# Patient Record
Sex: MALE | Race: WHITE | URBAN - METROPOLITAN AREA
[De-identification: names, ages, dates, MRNs, and addresses within clinical notes are randomized per-mention and may not be internally consistent; named-entity substitution may affect disease eponyms.]

---

## 2022-01-16 ENCOUNTER — HOSPITAL ENCOUNTER (OUTPATIENT)
Dept: OCCUPATIONAL THERAPY | Facility: HOSPITAL | Age: 67
End: 2022-01-19
Attending: STUDENT IN AN ORGANIZED HEALTH CARE EDUCATION/TRAINING PROGRAM | Admitting: STUDENT IN AN ORGANIZED HEALTH CARE EDUCATION/TRAINING PROGRAM

## 2022-04-28 NOTE — CONSULTS
Patient:   Beltran Cardona            MRN: [id number]            FIN: [id number]               Age:   66 years     Sex:  Male     :  1955   Associated Diagnoses:   None   Author:   Gumaro Ewing MD      Chief Complaint   2022 5:09 CST       Complaint of nosebleed, onset at 0100.  Denies blood thinners, but does take aspirin 81mg.  Bleeding from right nare.        History of Present Illness   Patient is a 66-year-old male who had a 2-week history of prior to admission of recurrent epistaxis which has been mostly self-limiting went on the night prior to admission he started bleeding at 1:00 in the morning which brought him into the emergency room and he has significant bleed dropping his hemoglobin by two points.  He was seen in the emergency room where a 4.5 cm Rhino Rocket was placed and did not control it and was switched to a 7.5 cm Rhino Rocket which did control the bleeding.  He has had this in place and has had no further bleeding since that time.  He does have a history of blood pressure medication as well as he takes an aspirin daily.  The aspirin has been stopped.      Health Status   Allergies:    Allergic Reactions (Selected)  No Known Medication Allergies,    Allergies (1) Active Reaction  No Known Medication Allergies None Documented     Current medications:  (Selected)   Inpatient Medications  Ordered  Afrin Nasal Sinus: 2 spray(s), form: Spray, Nasal, As Directed, first dose 22 5:29:00 CST, Waste Code BKC  Augmentin 500 mg-125 mg oral tablet: 500 mg, form: Tab Other - see special instructions, Oral, TID, first dose 22 14:00:00 CST, Nasal packing  Lactated Ringers Injection intravenous solution 1,000 mL: 1,000 mL, 1,000 mL, IV, 75 mL/hr, start date 22 9:52:00 CST, 2, m2  PARoxetine: 20 mg, form: Tab, Oral, Daily, first dose 22 13:44:00 CST, STAT  Zofran: 4 mg, form: Injection, IV Push, q4hr PRN for nausea, first dose 22 9:52:00 CST, STAT, choose  first if ordered with other treatments for nausea  acetaminophen: 1,000 mg, form: Tab, Oral, q6hr PRN for pain, first dose 01/16/22 9:52:00 CST, STAT, mild/moderate  atorvastatin: 40 mg, form: Tab, Oral, Daily, first dose 01/16/22 17:00:00 CST  ezetimibe: 10 mg, form: Tab, Oral, Daily, first dose 01/16/22 13:44:00 CST, STAT  hydrochlorothiazide: 12.5 mg, form: Tab, Oral, Daily, first dose 01/16/22 13:44:00 CST, STAT  losartan 50 mg oral tablet: 50 mg, form: Tab, Oral, Daily, first dose 01/16/22 13:44:00 CST, STAT  metoprolol tartrate 50 mg oral tab: 50 mg, form: Tab, Oral, BID, first dose 01/16/22 21:00:00 CST  Documented Medications  Documented  Bystolic 10 mg oral tablet: 10 mg = 1 tab(s), Oral, Daily  Ranexa 500mg Tab extended release: 500 mg = 1 tab(s), Oral, BID  aspirin 325 mg oral Delayed Release (EC) tablet: 325 mg = 1 tab(s), Oral, Daily, # 30 tab(s), 0 Refill(s)  cilostazol 100 mg oral tablet: 100 mg = 1 tab(s), Oral, BID  ezetimibe 10 mg oral tablet: 10 mg = 1 tab(s), Oral, Daily  furosemide 40 mg oral tablet: 40 mg = 1 tab(s), Oral, Daily  hydrochlorothiazide 12.5 mg oral tablet: 12.5 mg = 1 tab(s), Oral, Daily  losartan 50 mg oral tablet: 50 mg = 1 tab(s), Oral, Daily  meloxicam 15 mg oral tablet: 15 mg = 1 tab(s), Oral, Daily  paroxetine 20 mg oral tablet: 20 mg = 1 tab(s), Oral, Daily  simvastatin 80 mg oral tablet: 80 mg = 1 tab(s), Oral, Daily,    Medications (11) Active  Scheduled: (8)  amoxicillin-clavulanate 500 mg UD  500 mg 1 tab(s), Oral, TID  atorvastatin 40 mg Tab  40 mg 1 tab(s), Oral, Daily  ezetimibe 10 mg Tab  10 mg 1 tab(s), Oral, Daily  hydrochlorothiazide 25 mg Tab UD  12.5 mg 0.5 tab(s), Oral, Daily  losartan 50 mg Tab UD  50 mg 1 tab(s), Oral, Daily  metoprolol tartrate 50 mg Tab UD  50 mg 1 tab(s), Oral, BID  oxymetazoline nasal 0.05% Spray - 30mL  2 spray(s), Nasal, As Directed  paroxetine 20 mg Tab  20 mg 1 tab(s), Oral, Daily  Continuous: (1)  lactated ringers 1,000 mL  1,000  mL, IV, 75 mL/hr  PRN: (2)  acetaminophen 500 mg Tab  1,000 mg 2 tab(s), Oral, q6hr  ondansetron 2 mg/mL inj - 2mL  4 mg 2 mL, IV Push, q4hr     Problem list:    All Problems  Obesity / SNOMED CT 9901961525 / Probable  Tobacco user / SNOMED CT 019402717 / Confirmed      Histories   Past Medical History:    No active or resolved past medical history items have been selected or recorded.   Family History:    No family history items have been selected or recorded.   Social History        Social & Psychosocial Habits    Tobacco  01/16/2022  Use: 10 or more cigarettes (1/    Patient Wants Consult For Cessation Counseling No    Abuse/Neglect  01/16/2022  SHX Any signs of abuse or neglect No    Feels unsafe at home: No    Safe place to go: Yes  .        Physical Examination   Vital Signs   1/17/2022 6:00 CST       Peripheral Pulse Rate     68 bpm                             Respiratory Rate          20 br/min                             SpO2                      96 %                             Oxygen Therapy            Room air                             Systolic Blood Pressure   157 mmHg  HI                             Diastolic Blood Pressure  69 mmHg                             Mean Arterial Pressure, Cuff              98 mmHg    1/17/2022 4:00 CST       Peripheral Pulse Rate     61 bpm                             Respiratory Rate          20 br/min                             SpO2                      96 %                             Oxygen Therapy            Room air                             Systolic Blood Pressure   151 mmHg  HI                             Diastolic Blood Pressure  73 mmHg                             Mean Arterial Pressure, Cuff              99 mmHg    1/17/2022 0:00 CST       Peripheral Pulse Rate     72 bpm                             Heart Rate Monitored      72 bpm                             Respiratory Rate          21 br/min                             SpO2                      95 %                              Oxygen Therapy            Room air                             Systolic Blood Pressure   131 mmHg                             Diastolic Blood Pressure  60 mmHg                             Mean Arterial Pressure, Cuff              84 mmHg    1/16/2022 22:00 CST      Peripheral Pulse Rate     79 bpm                             Heart Rate Monitored      78 bpm                             Respiratory Rate          23 br/min                             SpO2                      95 %                             Oxygen Therapy            Room air                             Systolic Blood Pressure   123 mmHg                             Diastolic Blood Pressure  61 mmHg                             Mean Arterial Pressure, Cuff              82 mmHg    1/16/2022 19:00 CST      Peripheral Pulse Rate     91 bpm                             Heart Rate Monitored      95 bpm                             Respiratory Rate          17 br/min                             SpO2                      97 %                             Oxygen Therapy            Room air                             Systolic Blood Pressure   131 mmHg                             Diastolic Blood Pressure  45 mmHg  LOW                             Mean Arterial Pressure, Cuff              74 mmHg    1/16/2022 17:00 CST      Peripheral Pulse Rate     81 bpm                             Heart Rate Monitored      82 bpm                             Respiratory Rate          18 br/min                             SpO2                      98 %                             Oxygen Therapy            Room air                             Systolic Blood Pressure   140 mmHg                             Diastolic Blood Pressure  59 mmHg  LOW                             Mean Arterial Pressure, Cuff              86 mmHg    1/16/2022 15:00 CST      Peripheral Pulse Rate     78 bpm                             Heart Rate Monitored      77 bpm                              Respiratory Rate          18 br/min                             SpO2                      97 %                             Oxygen Therapy            Room air                             Systolic Blood Pressure   165 mmHg  HI                             Diastolic Blood Pressure  66 mmHg                             Mean Arterial Pressure, Cuff              99 mmHg    1/16/2022 13:00 CST      Peripheral Pulse Rate     75 bpm                             Heart Rate Monitored      75 bpm                             Respiratory Rate          15 br/min                             SpO2                      98 %                             Oxygen Therapy            Room air                             Systolic Blood Pressure   167 mmHg  HI                             Diastolic Blood Pressure  73 mmHg                             Mean Arterial Pressure, Cuff              104 mmHg    1/16/2022 11:15 CST      Peripheral Pulse Rate     84 bpm                             Heart Rate Monitored      82 bpm                             Respiratory Rate          24 br/min                             SpO2                      99 %                             Oxygen Therapy            Room air                             Systolic Blood Pressure   153 mmHg  HI                             Diastolic Blood Pressure  70 mmHg                             Mean Arterial Pressure, Cuff              98 mmHg    1/16/2022 8:48 CST       Systolic Blood Pressure   156 mmHg  HI                             Diastolic Blood Pressure  87 mmHg    1/16/2022 7:37 CST       Systolic Blood Pressure   92 mmHg                             Diastolic Blood Pressure  57 mmHg  LOW    1/16/2022 7:10 CST       Temperature Oral          36.5 DegC                             Temperature Oral (calculated)             97.70 DegF                             Peripheral Pulse Rate     79 bpm                             Respiratory Rate          20 br/min                              SpO2                      95 %                             Oxygen Therapy            Room air                             Systolic Blood Pressure   102 mmHg                             Diastolic Blood Pressure  63 mmHg    1/16/2022 5:09 CST       Temperature Oral          36.6 DegC                             Temperature Oral (calculated)             97.88 DegF                             Peripheral Pulse Rate     86 bpm                             Respiratory Rate          20 br/min                             SpO2                      93 %  LOW                             Systolic Blood Pressure   148 mmHg  HI                             Diastolic Blood Pressure  76 mmHg        Vital Signs (last 24 hrs)_____  Last Charted___________  Heart Rate Peripheral   68 bpm  (JAN 17 06:00)  Resp Rate         20 br/min  (JAN 17 06:00)  SBP      H 157mmHg  (JAN 17 06:00)  DBP      69 mmHg  (JAN 17 06:00)  SpO2      96 %  (JAN 17 06:00)  Weight      90.3 kg  (JAN 16 18:18)  Height      168 cm  (JAN 16 18:18)  BMI      31.99  (JAN 16 18:18)     Awake alert in good spirits.  He remains down in the emergency department in room and holding right now.  Rhino Rocket is in place there is no active bleeding.  He denies any bleeding since having the rocket placed  I will probably go ahead and remove the pack tomorrow unless there is any problems if he has any problems with bleeding.      Review / Management   Results review:     Labs (Last four charted values)  WBC                  H 17.6 (JAN 17) H 13.2 (JAN 16)   Hgb                  L 10.9 (JAN 17) L 10.7 (JAN 16) L 11.0 (JAN 16) L 12.8 (JAN 16)   Hct                  L 33.9 (JAN 17) L 32.6 (JAN 16) L 33.8 (JAN 16) L 39.4 (JAN 16)   Plt                  203 (JAN 17) 268 (JAN 16)   Na                   136 (JAN 17)   K                    4.0 (JAN 17)   CO2                  L 22 (JAN 17)   Cl                   106 (JAN 17)   Cr                   0.81 (JAN 17)   BUN                   16.0 (JAN 17)   Glucose Random       H 177 (JAN 17)   PT                   13.7 (JAN 16)   INR                  1.1 (JAN 16)   PTT                  32.2 (JAN 16) .       Impression and Plan   Epistaxis    To see his blood pressure controlled and allow his platelets to return for least another day and we will go ahead and remove the pack tomorrow if all is well.

## 2022-04-28 NOTE — ED PROVIDER NOTES
Patient:   Beltran Cardona            MRN: [id number]            FIN: [id number]               Age:   66 years     Sex:  Male     :  1955   Associated Diagnoses:   Epistaxis; Acute blood loss anemia   Author:   Juanito Mccurdy MD      Basic Information   Time seen: Date & time 2022 05:05:00.   History source: Patient.   Arrival mode: Private vehicle.   History limitation: None.      History of Present Illness   The patient presents with   A 66-year-old male presents to ED c/o intermittent bleeding onset over the past few weeks. However, tonight he began bleeding from the right nare that has mckay on going since 0100. Associated symptoms and slight light headedness. He denies use of blood thinners, but reports he takes 81mg aspirin and fish oil. Pt reports pressure to the area does not provide relief. .  The onset was 0100.  The course/duration of symptoms is constant.  Location: Right nare. Type of injury: none.  The degree at onset was moderate.  The degree at present is minimal.  Risk factors consist of.  Prior episodes: none.  Therapy today: none.  Associated symptoms: Slight lightheadedness, blood sputum .  Additional history: none.        Review of Systems   Constitutional symptoms:  No fever, no chills   Skin symptoms:  No rash, no pruritus, no abrasions, no breakdown, no lesion.    Eye symptoms:  No pain, no blurred vision.    ENMT symptoms:  Blood sputum , no ear pain, no sore throat, no nasal congestion, Nose: Bleeding.    Respiratory symptoms:  No shortness of breath, no cough, no sputum production   Cardiovascular symptoms:  No chest pain, no palpitations, no diaphoresis   Gastrointestinal symptoms:  No abdominal pain, no nausea, no vomiting, no diarrhea, no constipation.    Genitourinary symptoms:  No dysuria, no hematuria, no discharge.    Musculoskeletal symptoms:  No Muscle pain, no Joint pain   Neurologic symptoms:  Slight light headedness, no headache, no dizziness, no altered  level of consciousness, no numbness, no tingling, no weakness.              Additional review of systems information: All other systems reviewed and otherwise negative.      Health Status   Allergies: No known allergies.   Medications:  (Selected)   Inpatient Medications  Ordered  Afrin Nasal Sinus: 2 spray(s), form: Spray, Nasal, As Directed, first dose 01/16/22 5:29:00 CST, Waste Code BKC.      Past Medical/ Family/ Social History   Medical history:   Tobacco user   quadruple bypass.   Surgical history:    No active procedure history items have been selected or recorded..   Family history:    No family history items have been selected or recorded..   Social history: Tobacco use: Regularly.      Physical Examination               Vital Signs   Vital Signs   1/16/2022 7:10 CST       Temperature Oral          36.5 DegC                             Temperature Oral (calculated)             97.70 DegF                             Peripheral Pulse Rate     79 bpm                             Respiratory Rate          20 br/min                             SpO2                      95 %                             Oxygen Therapy            Room air                             Systolic Blood Pressure   102 mmHg                             Diastolic Blood Pressure  63 mmHg    1/16/2022 5:09 CST       Temperature Oral          36.6 DegC                             Temperature Oral (calculated)             97.88 DegF                             Peripheral Pulse Rate     86 bpm                             Respiratory Rate          20 br/min                             SpO2                      93 %  LOW                             Systolic Blood Pressure   148 mmHg  HI                             Diastolic Blood Pressure  76 mmHg  .   Measurements   1/16/2022 5:09 CST       Weight Dosing             90.3 kg                             Weight Measured and Calculated in Lbs     199.08 lb                             Weight Estimated           90.3 kg                             Height/Length Dosing      168 cm                             Height/Length Estimated   168 cm                             Body Mass Index Estimated 31.99 kg/m2  .   Basic Oxygen Information   1/16/2022 5:09 CST       SpO2                      93 %  LOW  .   General:  Alert, no acute distress.    Skin:  Warm, dry, pink.    Head:  Normocephalic, atraumatic.    Neck:  Supple, trachea midline.    Eye:  Pupils are equal, round and reactive to light, extraocular movements are intact, normal conjunctiva.    Ears, nose, mouth and throat:  Blood in oropharynx, Nose: Steady bleeding to right nare with blood to the back throat .    Cardiovascular:  Regular rate and rhythm, No murmur.    Gastrointestinal:  Soft, Nontender, Non distended, Normal bowel sounds.    Musculoskeletal:  Normal ROM, normal strength, no tenderness, no swelling, no deformity.    Neurological:  Alert and oriented to person, place, time, and situation, No focal neurological deficit observed.       Medical Decision Making   Differential Diagnosis:  Anterior epistaxis, posterior epistaxis.    Rationale:  Initially patient is well-appearing.  He has a constant drip from his right nare.  Wife reports history of epistaxis off and on for approximately 10 minutes for a few weeks however today it has been ongoing for over 5 to 6 hours.  Attempts to stop with pressure are unsuccessful.  Packing with Rhino Rocket is attempted twice.  Initially with a 4.5 cm packing that does stop bleeding from right nare but unfortunately diverts blood to left nare.  With concern for posterior bleed a 7.5 cm packing is placed, after initial dribbling, bleeding stops.  Approximately 20 minutes after patient becomes diaphoretic, pale, reports feeling weak.  This may have been a vasovagal reaction to the packing however his labs do reflect a two-point decrease in hemoglobin after approximately 2 hours.  Patient once again reiterates he is  only on aspirin, denies any other anticoagulations.  I discussed the case with ENT on-call, Dr. Gumaro Ewing, who states he will see patient either later today or tomorrow .  Patient will be placed on Augmentin for prophylaxis.  Given patient's significant acute blood loss anemia, symptoms, epistaxis, and lack of ENT in his hometown, he will be admitted to observation..   Documents reviewed:  Emergency department nurses' notes.   Orders  Launch Order Profile (Selected)   Inpatient Orders  InProcess (Ordered)  EK22 7:36:00 CST, Stat, Once, 22 7:36:00 CST, -1, -1, 22 7:36:00 CST  Ordered  Afrin Nasal Sinus: 2 spray(s), form: Spray, Nasal, As Directed, first dose 22 5:29:00 CST, Waste Code BKC  Ordered (In-Lab)  Hemoglobin and Hematocrit: STAT collect, 22 8:01:17 CST, BLOOD, Collected, Stop date 22 8:01:00 CST, Lab Collect  Completed  Afrin Nasal Sinus: 2 spray(s), form: Spray-Nasal, Both Nostrils, As Directed, first dose 22 5:19:00 CST, Waste Code BKC  Automated Diff: NOW collect, 22 5:57:00 CST, Blood, Collected, Stop date 22 5:57:00 CST, Lab Collect, Print Label By Order Location, 22 5:28:00 CST  CBC w/ Auto Diff: NOW collect, 22 5:57:42 CST, BLOOD, Collected, Stop date 22 5:57:00 CST, Lab Collect  PT (Protime): STAT collect, 22 5:57:42 CST, BLOOD, Collected, Stop date 22 5:57:00 CST, Lab Collect  PTT: STAT collect, 22 5:57:42 CST, BLOOD, Collected, Stop date 22 5:57:00 CST, Lab Collect  Sodium Chloride 0.9% intravenous solution: 1,000 mL, IV, AdHoc, start date 22 7:37:00 CST, stop date 22 7:37:00 CST.   Electrocardiogram:  Time 2022 08:04:00, rate 76, normal sinus rhythm, No ST-T changes, no ectopy, EP Interp, The Axis is Right axis deviation .  , P wave and NE interval poor R wave progression, QT interval Prolonged 474 seconds.    Results review:  Lab results : [facility]   2022 5:57 CST        WBC                       13.2 x10(3)/mcL  HI                             RBC                       4.28 x10(6)/mcL  LOW                             Hgb                       12.8 gm/dL  LOW                             Hct                       39.4 %  LOW                             Platelet                  268 x10(3)/mcL                             MCV                       92.1 fL                             MCH                       29.9 pg                             MCHC                      32.5 gm/dL  LOW                             RDW                       13.1 %                             MPV                       10.5 fL                             Abs Neut                  7.25 x10(3)/mcL                             Neutro Auto               55 %  NA                             Lymph Auto                32 %  NA                             Mono Auto                 7 %  NA                             Eos Auto                  4 %  NA                             Abs Eos                   0.6 x10(3)/mcL                             Basophil Auto             1 %  NA                             Abs Neutro                7.25 x10(3)/mcL                             Abs Lymph                 4.2 x10(3)/mcL                             Abs Mono                  0.9 x10(3)/mcL                             Abs Baso                  0.2 x10(3)/mcL                             PT                        13.7 second(s)                             INR                       1.1                             PTT                       32.2 second(s)  .      Reexamination/ Reevaluation   Time: 1/16/2022 08:14:00 .   Pain status: resolved.   Notes: Bleeding controlled however patient feels weak, is pale, diaphoretic..      Procedure   Nose bleed control procedure   Time: 1/16/2022 08:14:00 .    Confirmed: Patient, procedure, side, and site correct.    Consent: Patient.    Location of bleeding: Right nasal canal,  Anteriorly, Posteriorly.    Preparation: External pressure, Afrin.       Description   Attempt: # 1.   Packing: right, anterior, nasal rocket, Bleeding at right nare stops however there is now bleeding from left nare.      Description   Attempt: # 2.   Packing: right, anterior, posterior, nasal rocket, 7.5 cm Rhino Rocket is placed in right nare. Initially there continues to be some bleeding however after approximately 20 minutes there is no longer any bleeding, no dripping, no bleeding from left nare..   Post procedure bleeding: None.    Patient tolerated: Fair.    Complications: None.    Follow-up: Ear nose and throat physician.    Performed by: Self.    Total time: 30 minutes.       Impression and Plan   Diagnosis   Epistaxis (YSI23-EL R04.0)   Acute blood loss anemia ([id number] D62)      Calls-Consults   -  1/16/2022 08:53:00 , ENT, Paged.    -  1/16/2022 08:55:00 , ENT, phone call, consult, recommends They will come see patient tomorrow.    Plan   Condition: Improved, Stable.    Disposition: Medically cleared, Admit time  1/16/2022 09:12:00, Place in Observation Unit.    Counseled: Patient, Regarding diagnosis, Regarding diagnostic results, Regarding treatment plan, Patient indicated understanding of instructions.    Notes: I, Lexi Rowley, acted solely as a scribe for and in the presence of Dr. Santana who performed this service. .       Addendum   I, Juanito Santana MD, personally performed the services described in this documentation as described in my presence. I performed the history, physical exam, and had face-to-face time with the patient.  This note is accurate and complete.

## 2022-05-01 NOTE — HISTORICAL [facility] CERNER
This is a historical note converted from [employer]. Formatting and pictures may have been removed.  Please reference [employer] for original formatting and attached multimedia. Chief Complaint  Complaint of nosebleed, onset at 0100. ?Denies blood thinners, but does take aspirin 81mg. ?Bleeding from right nare.  History of Present Illness  66 year old?  male presented to the ED with reports of nose bleed which started at? 1 am. Pt reports he tried pinching his nose and applying pressure which his nose continues to bleed which he presented to the ED for further evaluation. PMH includes CAD, HTN, HLD, PVD/PAD, chronic neck and back pain?. He reports prior episode which he is able to stop the bleeding in the past. He denies any injury or trauma. He reports?the began bleeding from the right nare. He reported light headedness and dizziness, denies any syncope. He denies any chest pain, SOB, fever, chills, N/V/D. Labs done revealed CBC is 13.2, H&H 12.8/39.4, platelets 268; other indices unremarkable.? Patient had a repeat H&H 11/33.8 with slight drop. PT is not on any AC therapy. He only take ASA at home. ENT was consulted? who will see patient in the am per ED provider. PT?VS stable and bleeding has stopped. Pt is admitted to hospital medicine services for further management..  Review of Systems  ????Except as document, all other systems reviewed and negative  Physical Exam  Vitals & Measurements  T:?36.5? ?C (Oral)? TMIN:?36.5? ?C (Oral)? TMAX:?36.6? ?C (Oral)? HR:?84(Peripheral)? HR:?82(Monitored)? RR:?24? BP:?153/70? SpO2:?99%? WT:?90.3?kg?  General:?chronically ill appearing, non-toxic, look older than stated age, NAD, wife at bedside  Eye: PERRL,? clear conjunctiva  HENT:?Atraumatic, OP no erythema, right nare packing noted, mild congestion,?nasal mucosal surfaces moist, no maxillary/frontal sinus tenderness to palpation  Neck: full range of motion, no thyromegaly or lymphadenopathy  Respiratory:?diminished  bilaterally, symmetrical expansion, unlabored, stable O 2 saturations  Cardiovascular:?regular rate and rhythm cap refill brisk, no edema  Gastrointestinal:?soft, non-tender, non-distended with normal bowel sounds, without masses to palpation  Genitourinary: no CVA tenderness to palpation  Musculoskeletal:?full range of motion of all extremities  Integumentary: warm and dry  Neurologic: cranial nerves intact, no signs of peripheral neurological deficit, motor/sensory function intact  Psychiatric: cooperative, appropriate mood and affect  Cognition and Speech: clear and coherent  Assessment/Plan  IMPRESSION:  Acute epistaxis- resolving  Anemia- secondary to blood loss  Hypovolemic hypotension- resolved  Dizziness/Lightheadedness  CAD- s/p?MI and CABG  Chronic neck and back pain  Weakness  ?   PLAN:  Serial   ENT consulted, appreciate assistance and recommendations  Maintain nasal packing until evaluated by ENT services  Hold any AC therapy, hold ASA  Resume home medication as deemed necessary  Labs in the am  GI/DVT prophylaxis  ?   Source of history:patient, family ED provider, medical record  Present at bedside: family  Referral source: ED  History limitation: none  ?   PCP: non-staff  ?   ADVANCED DIRECTIVES: none  CODE STATUS:? full  ?   I, Aimee Longoria APRN, FNP, reviewed and discussed the case with Dr. ADRIAN Gunter.?See addendum for further per MD.?  ?  ?  ?  I Dr. Gunter Assumed care of patient today at 14:00  ?  For this patient encounter, I reviewed the NP/PA documentation, treatment plan and medical decision making, and I had face to face time with this patient.?  ?  Seen and examined the patient agree with above history physical exam and management.  Hold AC and aspirin.  ENT consult.  Patient is doing well by tomorrow possible discharge.  Check H&H.   Problem List/Past Medical History  CAD, HTN, HLD, PVD/PAD, chronic neck and back pain  Procedure/Surgical History  CABG, back surgery, neck surgery,  right knee surgery  Medications  Inpatient  acetaminophen, 1000 mg= 2 tab(s), Oral, q6hr, PRN  Afrin Nasal Sinus, 2 spray(s), Nasal, As Directed  Augmentin 500 mg-125 mg oral tablet, 500 mg= 1 tab(s), Oral, TID  Lactated Ringers Injection intravenous solution 1,000 mL, 1000 mL, IV  Zofran, 4 mg= 2 mL, IV Push, q4hr, PRN  Home  aspirin 325 mg oral Delayed Release (EC) tablet, 325 mg= 1 tab(s), Oral, Daily  Bystolic 10 mg oral tablet, 10 mg= 1 tab(s), Oral, Daily  cilostazol 100 mg oral tablet, 100 mg= 1 tab(s), Oral, BID  ezetimibe 10 mg oral tablet, 10 mg= 1 tab(s), Oral, Daily  furosemide 40 mg oral tablet, 40 mg= 1 tab(s), Oral, Daily  hydrochlorothiazide 12.5 mg oral tablet, 12.5 mg= 1 tab(s), Oral, Daily  losartan 50 mg oral tablet, 50 mg= 1 tab(s), Oral, Daily  meloxicam 15 mg oral tablet, 15 mg= 1 tab(s), Oral, Daily  paroxetine 20 mg oral tablet, 20 mg= 1 tab(s), Oral, Daily  Ranexa 500mg Tab extended release, 500 mg= 1 tab(s), Oral, BID  simvastatin 80 mg oral tablet, 80 mg= 1 tab(s), Oral, Daily  Allergies  No Known Medication Allergies  Social History  Tobacco  10 or more cigarettes (1/2 pack or more)/day in last 30 days, No, 01/16/2022  Family History  Reviewed and non-significant to present medical condition  Immunizations  UTD  Lab Results  Labs Last 24 Hours?  ?Hematology/Coagulation?   WBC:?13.2 x10(3)/mcL?High (01/16/22 05:57:00)   RBC:?4.28 x10(6)/mcL?Low (01/16/22 05:57:00)   Hgb:?11 gm/dL?Low (01/16/22 08:01:00)   Hct:?33.8 %?Low (01/16/22 08:01:00)   Platelet: 268 x10(3)/mcL (01/16/22 05:57:00)   MCV: 92.1 fL (01/16/22 05:57:00)   MCH: 29.9 pg (01/16/22 05:57:00)   MCHC:?32.5 gm/dL?Low (01/16/22 05:57:00)   RDW: 13.1 % (01/16/22 05:57:00)   MPV: 10.5 fL (01/16/22 05:57:00)   Abs Neut: 7.25 x10(3)/mcL (01/16/22 05:57:00)   Neutro Auto: 55 % (01/16/22 05:57:00)   Lymph Auto: 32 % (01/16/22 05:57:00)   Mono Auto: 7 % (01/16/22 05:57:00)   Eos Auto: 4 % (01/16/22 05:57:00)   Abs Eos: 0.6 x10(3)/mcL  (01/16/22 05:57:00)   Basophil Auto: 1 % (01/16/22 05:57:00)   Abs Neutro: 7.25 x10(3)/mcL (01/16/22 05:57:00)   Abs Lymph: 4.2 x10(3)/mcL (01/16/22 05:57:00)   Abs Mono: 0.9 x10(3)/mcL (01/16/22 05:57:00)   Abs Baso: 0.2 x10(3)/mcL (01/16/22 05:57:00)   PT: 13.7 second(s) (01/16/22 05:57:00)   INR: 1.1 (01/16/22 05:57:00)   PTT: 32.2 second(s) (01/16/22 05:57:00)

## 2022-05-14 NOTE — DISCHARGE SUMMARY
Admit and Discharge Dates  Admit Date: 01/16/2022  Discharge Date: 01/19/2022  Physicians  Attending Physician - Isai matt  Admitting Physician - Lyric MATT, Benny  Consulting Physician - Gildardo MATT, Gumaro  Primary Care Physician - Physician MD, Non Staff  Discharge Diagnosis  Acute blood loss anemia?D62  Epistaxis?[id number]  Epistaxis?R04.0  ?1-epistaxis - resolved. nasal packing removed  2-acute blood loss anemia secondary to epistaxis - resolved  3-essential hypertension  4-normochromic normocytic anemia  5-leukocytosis?with no shift  6-hld  7- PAD  ?  ?   follow up w ENT in [place]. continue to hold asa/nsaids/cilostazol  ?  ?  ?Patient was taking aspirin/Mobic?and?cilostazol?at home- all in hold  Surgical Procedures  No procedures recorded for this visit.  Immunizations  No immunizations recorded for this visit.  Admission Information  Patient is a 66-year-old male who had a 2-week history prior to admission of recurrent epistaxis which has been mostly self-limiting went on the night prior to admission he started bleeding at 1:00 in the morning which brought him into the emergency room and he has significant bleed dropping his hemoglobin by two points. ?He was seen in the emergency room where a 4.5 cm Rhino Rocket was placed and did not control it and was switched to a 7.5 cm Rhino Rocket which did control the bleeding.ENT consulted.??He has had this in place and has had no further bleeding since that time. ?He does have a history of blood pressure medication as well as he takes an aspirin/mobic/pletal daily.??all on hold.  ?   ??No active bleeding overnight. ?H&H is stable as well as vital signs.????Patient was seen by?ENT with recommendations given. ?Patient remains with leukocytosis?on Augmentin.?  Nasal packing removed by ENT. no active bleeding. Pt is from [place] and dr yenni womack ent was able to give some names of ents md for him to follow. pt was discharged home. no  asa/nsaids/pletal until seen by ent in [place] as well as primary md.  ?  ?  ?  ?  ?  ?  DISCHARGE SUMMARY GREATER THAN 35 MINUTES  Objective  Physical Exam  General:?well-developed well-nourished in no acute distress  Eye: PERRLA, EOMI, clear conjunctiva, eyelids normal  HENT:right rhino rocket removed . no active bleeding noted  Neck: full range of motion, no thyromegaly or lymphadenopathy  Respiratory:?clear to auscultation bilaterally  Cardiovascular:?regular rate and rhythm without murmurs, gallops or rubs  Gastrointestinal:?soft, non-tender, non-distended with normal bowel sounds, without masses to palpation  Genitourinary: no CVA tenderness to palpation  Musculoskeletal:?full range of motion of all extremities/spine without limitation or discomfort  Integumentary: no rashes or skin lesions present  Neurologic: cranial nerves intact, no signs of peripheral neurological deficit, motor/sensory function intact  Patient Discharge Condition  improved and stable  Discharge Disposition  home   Discharge Medication Reconciliation  Prescribed  amoxicillin-clavulanate (Augmentin 875 mg-125 mg oral tablet)?1 tab(s), Oral, q12hr  oxymetazoline nasal (Afrin 0.05% nasal spray)?2 spray(s), Nasal, BID  sodium chloride nasal (Ocean 0.65% nasal solution)?2 spray(s), Nasal, q2hr, PRN other (see comment)  Continue  PARoxetine (paroxetine 20 mg oral tablet)?20 mg, Oral, Daily  ezetimibe (ezetimibe 10 mg oral tablet)?10 mg, Oral, Daily  furosemide (furosemide 40 mg oral tablet)?40 mg, Oral, Daily  hydrochlorothiazide (hydrochlorothiazide 12.5 mg oral tablet)?12.5 mg, Oral, Daily  losartan (losartan 50 mg oral tablet)?50 mg, Oral, Daily  nebivolol (Bystolic 10 mg oral tablet)?10 mg, Oral, Daily  ranolazine (Ranexa 500mg Tab extended release)?500 mg, Oral, BID  simvastatin (simvastatin 80 mg oral tablet)?80 mg, Oral, Daily  Discontinue  aspirin (aspirin 325 mg oral Delayed Release (EC) tablet)?325 mg, Oral, Daily  cilostazol  (cilostazol 100 mg oral tablet)?100 mg, Oral, BID  meloxicam (meloxicam 15 mg oral tablet)?15 mg, Oral, Daily  Education and Orders Provided  Nosebleed, Adult  Discharge - 01/19/22 10:50:00 CST, Home?  Follow up  Follow up with primary care provider, on 01/26/2022  ????Dr. Branch in [place].  Hold ?baby aspirin for 5 days. No aleve/motrin.hold cilastazol  [facility], on 01/26/2022  ????Appointment is with Dr. Con Fairbanks at [facility]  Car Seat Challenge  No Qualifying Data